# Patient Record
Sex: FEMALE | Race: WHITE | Employment: FULL TIME | URBAN - METROPOLITAN AREA
[De-identification: names, ages, dates, MRNs, and addresses within clinical notes are randomized per-mention and may not be internally consistent; named-entity substitution may affect disease eponyms.]

---

## 2017-05-23 ENCOUNTER — EMPLOYEE WELLNESS (OUTPATIENT)
Dept: OTHER | Age: 47
End: 2017-05-23

## 2017-05-23 LAB
CHOLESTEROL, TOTAL: 164 MG/DL (ref 0–199)
FASTING: YES
GLUCOSE BLD-MCNC: 101 MG/DL (ref 74–109)
HDLC SERPL-MCNC: 52 MG/DL (ref 40–90)
LDL CHOLESTEROL CALCULATED: 100 MG/DL
TRIGL SERPL-MCNC: 61 MG/DL (ref 0–199)

## 2017-07-24 ENCOUNTER — HOSPITAL ENCOUNTER (OUTPATIENT)
Dept: MAMMOGRAPHY | Age: 47
Discharge: HOME | End: 2017-07-24
Payer: COMMERCIAL

## 2017-07-24 DIAGNOSIS — Z13.9 SCREENING: ICD-10-CM

## 2017-07-24 PROCEDURE — G0202 SCR MAMMO BI INCL CAD: HCPCS

## 2018-03-20 VITALS — WEIGHT: 150 LBS | BODY MASS INDEX: 23.49 KG/M2

## 2018-04-12 ENCOUNTER — EMPLOYEE WELLNESS (OUTPATIENT)
Dept: OTHER | Age: 48
End: 2018-04-12

## 2018-04-12 LAB
CHOLESTEROL, TOTAL: 151 MG/DL (ref 0–199)
FASTING: YES
GLUCOSE BLD-MCNC: 94 MG/DL (ref 74–109)
HDLC SERPL-MCNC: 48 MG/DL (ref 40–90)
LDL CHOLESTEROL CALCULATED: 90 MG/DL
TRIGL SERPL-MCNC: 65 MG/DL (ref 0–199)

## 2018-04-16 VITALS — WEIGHT: 143 LBS | BODY MASS INDEX: 22.4 KG/M2

## 2018-09-10 ENCOUNTER — HOSPITAL ENCOUNTER (OUTPATIENT)
Dept: MAMMOGRAPHY | Age: 48
Discharge: HOME | End: 2018-09-10
Payer: COMMERCIAL

## 2018-09-10 DIAGNOSIS — Z12.39 BREAST CANCER SCREENING: ICD-10-CM

## 2018-09-10 PROCEDURE — 77067 SCR MAMMO BI INCL CAD: CPT

## 2018-11-15 ENCOUNTER — OFFICE VISIT (OUTPATIENT)
Dept: FAMILY MEDICINE CLINIC | Age: 48
End: 2018-11-15
Payer: COMMERCIAL

## 2018-11-15 VITALS — WEIGHT: 152.6 LBS | BODY MASS INDEX: 23.95 KG/M2 | HEIGHT: 67 IN | TEMPERATURE: 98.7 F

## 2018-11-15 DIAGNOSIS — Q62.5 DOUBLE URETER ON RIGHT: Chronic | ICD-10-CM

## 2018-11-15 DIAGNOSIS — N30.90 CYSTITIS: Primary | ICD-10-CM

## 2018-11-15 PROCEDURE — 99213 OFFICE O/P EST LOW 20 MIN: CPT | Performed by: FAMILY MEDICINE

## 2018-11-15 RX ORDER — SULFAMETHOXAZOLE AND TRIMETHOPRIM 800; 160 MG/1; MG/1
1 TABLET ORAL 2 TIMES DAILY
Qty: 60 TABLET | Refills: 0 | Status: SHIPPED | OUTPATIENT
Start: 2018-11-15 | End: 2019-12-27

## 2018-11-15 ASSESSMENT — PATIENT HEALTH QUESTIONNAIRE - PHQ9
1. LITTLE INTEREST OR PLEASURE IN DOING THINGS: 0
SUM OF ALL RESPONSES TO PHQ QUESTIONS 1-9: 0
SUM OF ALL RESPONSES TO PHQ QUESTIONS 1-9: 0
2. FEELING DOWN, DEPRESSED OR HOPELESS: 0
SUM OF ALL RESPONSES TO PHQ9 QUESTIONS 1 & 2: 0

## 2019-04-26 ENCOUNTER — EMPLOYEE WELLNESS (OUTPATIENT)
Dept: OTHER | Age: 49
End: 2019-04-26

## 2019-04-26 LAB
CHOLESTEROL, TOTAL: 161 MG/DL (ref 0–199)
FASTING: YES
GLUCOSE BLD-MCNC: 96 MG/DL (ref 74–109)
HDLC SERPL-MCNC: 51 MG/DL (ref 40–90)
LDL CHOLESTEROL CALCULATED: 101 MG/DL
TRIGL SERPL-MCNC: 44 MG/DL (ref 0–199)

## 2019-04-29 VITALS — BODY MASS INDEX: 23.02 KG/M2 | WEIGHT: 147 LBS

## 2019-06-29 ENCOUNTER — HOSPITAL ENCOUNTER (EMERGENCY)
Age: 49
Discharge: HOME | End: 2019-06-29
Payer: COMMERCIAL

## 2019-06-29 ENCOUNTER — APPOINTMENT (OUTPATIENT)
Dept: CT IMAGING | Age: 49
End: 2019-06-29
Payer: COMMERCIAL

## 2019-06-29 VITALS
HEIGHT: 67 IN | HEART RATE: 80 BPM | OXYGEN SATURATION: 100 % | BODY MASS INDEX: 22.76 KG/M2 | TEMPERATURE: 98 F | WEIGHT: 145 LBS | DIASTOLIC BLOOD PRESSURE: 81 MMHG | SYSTOLIC BLOOD PRESSURE: 129 MMHG | RESPIRATION RATE: 18 BRPM

## 2019-06-29 DIAGNOSIS — R10.84 GENERALIZED ABDOMINAL PAIN: Primary | ICD-10-CM

## 2019-06-29 DIAGNOSIS — K57.92 ACUTE DIVERTICULITIS: ICD-10-CM

## 2019-06-29 DIAGNOSIS — R11.0 NAUSEA: ICD-10-CM

## 2019-06-29 LAB
ALBUMIN SERPL-MCNC: 4 G/DL (ref 3.5–5.1)
ALP BLD-CCNC: 62 U/L (ref 38–126)
ALT SERPL-CCNC: 8 U/L (ref 11–66)
ANION GAP SERPL CALCULATED.3IONS-SCNC: 11 MEQ/L (ref 8–16)
AST SERPL-CCNC: 17 U/L (ref 5–40)
BASOPHILS # BLD: 0.4 %
BASOPHILS ABSOLUTE: 0 THOU/MM3 (ref 0–0.1)
BILIRUB SERPL-MCNC: 0.6 MG/DL (ref 0.3–1.2)
BILIRUBIN DIRECT: < 0.2 MG/DL (ref 0–0.3)
BILIRUBIN URINE: NEGATIVE
BLOOD, URINE: NEGATIVE
BUN BLDV-MCNC: 13 MG/DL (ref 7–22)
CALCIUM SERPL-MCNC: 8.7 MG/DL (ref 8.5–10.5)
CHARACTER, URINE: CLEAR
CHLORIDE BLD-SCNC: 104 MEQ/L (ref 98–111)
CO2: 22 MEQ/L (ref 23–33)
COLOR: YELLOW
CREAT SERPL-MCNC: 0.8 MG/DL (ref 0.4–1.2)
EOSINOPHIL # BLD: 0.6 %
EOSINOPHILS ABSOLUTE: 0.1 THOU/MM3 (ref 0–0.4)
ERYTHROCYTE [DISTWIDTH] IN BLOOD BY AUTOMATED COUNT: 12.5 % (ref 11.5–14.5)
ERYTHROCYTE [DISTWIDTH] IN BLOOD BY AUTOMATED COUNT: 44.9 FL (ref 35–45)
GFR SERPL CREATININE-BSD FRML MDRD: 75 ML/MIN/1.73M2
GLUCOSE BLD-MCNC: 99 MG/DL (ref 70–108)
GLUCOSE URINE: NEGATIVE MG/DL
HCT VFR BLD CALC: 38.9 % (ref 37–47)
HEMOGLOBIN: 13.3 GM/DL (ref 12–16)
IMMATURE GRANS (ABS): 0.05 THOU/MM3 (ref 0–0.07)
IMMATURE GRANULOCYTES: 0.5 %
KETONES, URINE: NEGATIVE
LEUKOCYTE ESTERASE, URINE: NEGATIVE
LIPASE: 23.5 U/L (ref 5.6–51.3)
LYMPHOCYTES # BLD: 17.1 %
LYMPHOCYTES ABSOLUTE: 1.9 THOU/MM3 (ref 1–4.8)
MCH RBC QN AUTO: 33.3 PG (ref 26–33)
MCHC RBC AUTO-ENTMCNC: 34.2 GM/DL (ref 32.2–35.5)
MCV RBC AUTO: 97.5 FL (ref 81–99)
MONOCYTES # BLD: 9.5 %
MONOCYTES ABSOLUTE: 1 THOU/MM3 (ref 0.4–1.3)
NITRITE, URINE: NEGATIVE
NUCLEATED RED BLOOD CELLS: 0 /100 WBC
OSMOLALITY CALCULATION: 274 MOSMOL/KG (ref 275–300)
PH UA: 6 (ref 5–9)
PLATELET # BLD: 223 THOU/MM3 (ref 130–400)
PMV BLD AUTO: 9.6 FL (ref 9.4–12.4)
POTASSIUM SERPL-SCNC: 3.9 MEQ/L (ref 3.5–5.2)
PREGNANCY, SERUM: NEGATIVE
PROTEIN UA: NEGATIVE
RBC # BLD: 3.99 MILL/MM3 (ref 4.2–5.4)
SEG NEUTROPHILS: 71.9 %
SEGMENTED NEUTROPHILS ABSOLUTE COUNT: 7.9 THOU/MM3 (ref 1.8–7.7)
SODIUM BLD-SCNC: 137 MEQ/L (ref 135–145)
SPECIFIC GRAVITY, URINE: 1.01 (ref 1–1.03)
TOTAL PROTEIN: 7.3 G/DL (ref 6.1–8)
UROBILINOGEN, URINE: 0.2 EU/DL (ref 0–1)
WBC # BLD: 11 THOU/MM3 (ref 4.8–10.8)

## 2019-06-29 PROCEDURE — 99213 OFFICE O/P EST LOW 20 MIN: CPT | Performed by: NURSE PRACTITIONER

## 2019-06-29 PROCEDURE — 83690 ASSAY OF LIPASE: CPT

## 2019-06-29 PROCEDURE — 36415 COLL VENOUS BLD VENIPUNCTURE: CPT

## 2019-06-29 PROCEDURE — 6360000002 HC RX W HCPCS: Performed by: PHYSICIAN ASSISTANT

## 2019-06-29 PROCEDURE — 74176 CT ABD & PELVIS W/O CONTRAST: CPT

## 2019-06-29 PROCEDURE — 96375 TX/PRO/DX INJ NEW DRUG ADDON: CPT

## 2019-06-29 PROCEDURE — 2500000003 HC RX 250 WO HCPCS: Performed by: PHYSICIAN ASSISTANT

## 2019-06-29 PROCEDURE — 96365 THER/PROPH/DIAG IV INF INIT: CPT

## 2019-06-29 PROCEDURE — 81003 URINALYSIS AUTO W/O SCOPE: CPT

## 2019-06-29 PROCEDURE — 82248 BILIRUBIN DIRECT: CPT

## 2019-06-29 PROCEDURE — 80053 COMPREHEN METABOLIC PANEL: CPT

## 2019-06-29 PROCEDURE — 84703 CHORIONIC GONADOTROPIN ASSAY: CPT

## 2019-06-29 PROCEDURE — 96368 THER/DIAG CONCURRENT INF: CPT

## 2019-06-29 PROCEDURE — 85025 COMPLETE CBC W/AUTO DIFF WBC: CPT

## 2019-06-29 PROCEDURE — 99215 OFFICE O/P EST HI 40 MIN: CPT

## 2019-06-29 PROCEDURE — 99284 EMERGENCY DEPT VISIT MOD MDM: CPT

## 2019-06-29 PROCEDURE — 2580000003 HC RX 258: Performed by: PHYSICIAN ASSISTANT

## 2019-06-29 RX ORDER — ONDANSETRON 4 MG/1
4 TABLET, FILM COATED ORAL EVERY 8 HOURS PRN
Qty: 20 TABLET | Refills: 0 | Status: SHIPPED | OUTPATIENT
Start: 2019-06-29 | End: 2019-07-19

## 2019-06-29 RX ORDER — CIPROFLOXACIN 500 MG/1
500 TABLET, FILM COATED ORAL 2 TIMES DAILY
Qty: 20 TABLET | Refills: 0 | Status: SHIPPED | OUTPATIENT
Start: 2019-06-29 | End: 2019-07-09

## 2019-06-29 RX ORDER — 0.9 % SODIUM CHLORIDE 0.9 %
1000 INTRAVENOUS SOLUTION INTRAVENOUS ONCE
Status: COMPLETED | OUTPATIENT
Start: 2019-06-29 | End: 2019-06-29

## 2019-06-29 RX ORDER — METRONIDAZOLE 500 MG/1
500 TABLET ORAL 3 TIMES DAILY
Qty: 30 TABLET | Refills: 0 | Status: SHIPPED | OUTPATIENT
Start: 2019-06-29 | End: 2019-07-09

## 2019-06-29 RX ORDER — CIPROFLOXACIN 2 MG/ML
400 INJECTION, SOLUTION INTRAVENOUS ONCE
Status: COMPLETED | OUTPATIENT
Start: 2019-06-29 | End: 2019-06-29

## 2019-06-29 RX ORDER — ONDANSETRON 2 MG/ML
4 INJECTION INTRAMUSCULAR; INTRAVENOUS ONCE
Status: COMPLETED | OUTPATIENT
Start: 2019-06-29 | End: 2019-06-29

## 2019-06-29 RX ORDER — HYDROCODONE BITARTRATE AND ACETAMINOPHEN 5; 325 MG/1; MG/1
1 TABLET ORAL EVERY 6 HOURS PRN
Qty: 12 TABLET | Refills: 0 | Status: SHIPPED | OUTPATIENT
Start: 2019-06-29 | End: 2019-07-02

## 2019-06-29 RX ORDER — MORPHINE SULFATE 2 MG/ML
2 INJECTION, SOLUTION INTRAMUSCULAR; INTRAVENOUS ONCE
Status: COMPLETED | OUTPATIENT
Start: 2019-06-29 | End: 2019-06-29

## 2019-06-29 RX ADMIN — SODIUM CHLORIDE 1000 ML: 9 INJECTION, SOLUTION INTRAVENOUS at 14:20

## 2019-06-29 RX ADMIN — CIPROFLOXACIN 400 MG: 2 INJECTION, SOLUTION INTRAVENOUS at 16:06

## 2019-06-29 RX ADMIN — MORPHINE SULFATE 2 MG: 2 INJECTION, SOLUTION INTRAMUSCULAR; INTRAVENOUS at 14:16

## 2019-06-29 RX ADMIN — METRONIDAZOLE 500 MG: 500 INJECTION, SOLUTION INTRAVENOUS at 16:06

## 2019-06-29 RX ADMIN — ONDANSETRON 4 MG: 2 INJECTION INTRAMUSCULAR; INTRAVENOUS at 14:16

## 2019-06-29 ASSESSMENT — PAIN DESCRIPTION - FREQUENCY: FREQUENCY: CONTINUOUS

## 2019-06-29 ASSESSMENT — ENCOUNTER SYMPTOMS
ABDOMINAL PAIN: 1
STRIDOR: 0
CHEST TIGHTNESS: 0
VOMITING: 0
DIARRHEA: 0
ABDOMINAL DISTENTION: 0
COUGH: 0
NAUSEA: 1
WHEEZING: 0
SHORTNESS OF BREATH: 0

## 2019-06-29 ASSESSMENT — PAIN DESCRIPTION - PROGRESSION: CLINICAL_PROGRESSION: NOT CHANGED

## 2019-06-29 ASSESSMENT — PAIN SCALES - GENERAL
PAINLEVEL_OUTOF10: 4
PAINLEVEL_OUTOF10: 5
PAINLEVEL_OUTOF10: 4

## 2019-06-29 ASSESSMENT — PAIN DESCRIPTION - ONSET: ONSET: AWAKENED FROM SLEEP

## 2019-06-29 ASSESSMENT — PAIN DESCRIPTION - PAIN TYPE: TYPE: ACUTE PAIN

## 2019-06-29 ASSESSMENT — PAIN DESCRIPTION - LOCATION: LOCATION: ABDOMEN

## 2019-06-29 ASSESSMENT — PAIN - FUNCTIONAL ASSESSMENT: PAIN_FUNCTIONAL_ASSESSMENT: ACTIVITIES ARE NOT PREVENTED

## 2019-06-29 ASSESSMENT — PAIN DESCRIPTION - DESCRIPTORS: DESCRIPTORS: CRAMPING;DISCOMFORT

## 2019-06-29 NOTE — ED PROVIDER NOTES
moist, No oral exudates, Nose normal.    Eyes: PERRLA, EOMI, Conjunctiva normal, No discharge. Neck: Normal range of motion, No tenderness, Supple, No stridor. Cardiovascular: Normal heart rate, Normal rhythm, No murmurs, No rubs, No gallops. Pulmonary/Chest: Normal breath sounds, No respiratory distress, No wheezing,     Abdomen: Bowel sounds normal, Soft, LLQ and suprapubic tenderness, No masses, No pulsatile masses    Back: No tenderness, No CVA tenderness    Extremities: Normal range of motion, Intact distal pulses, No edema, No tenderness; Neurologic: Alert & oriented x 3, CN II-XII intact; Normal motor function, Normal sensory function, No focal defecits    Skin: Warm, Dry, No erythema, No rash    Psychiatric: Alert and oriented to person, place, and time. Patient maintains good eye contact. Mood and affect were normal. Concentration appeared normal      Diagnostic Studies    Please see electronic medical record for any tests performed in the ED.      Labs:    Labs Reviewed   CBC WITH AUTO DIFFERENTIAL - Abnormal; Notable for the following components:       Result Value    WBC 11.0 (*)     RBC 3.99 (*)     MCH 33.3 (*)     Segs Absolute 7.9 (*)     All other components within normal limits   BASIC METABOLIC PANEL - Abnormal; Notable for the following components:    CO2 22 (*)     All other components within normal limits   HEPATIC FUNCTION PANEL - Abnormal; Notable for the following components:    ALT 8 (*)     All other components within normal limits   GLOMERULAR FILTRATION RATE, ESTIMATED - Abnormal; Notable for the following components:    Est, Glom Filt Rate 75 (*)     All other components within normal limits   OSMOLALITY - Abnormal; Notable for the following components:    Osmolality Calc 274.0 (*)     All other components within normal limits   URINE RT REFLEX TO CULTURE   LIPASE   HCG, SERUM, QUALITATIVE   ANION GAP       Radiology:    CT ABDOMEN PELVIS WO CONTRAST Additional Contrast?

## 2019-07-01 ENCOUNTER — OFFICE VISIT (OUTPATIENT)
Dept: FAMILY MEDICINE CLINIC | Age: 49
End: 2019-07-01
Payer: COMMERCIAL

## 2019-07-01 VITALS
TEMPERATURE: 98.6 F | DIASTOLIC BLOOD PRESSURE: 60 MMHG | WEIGHT: 151.2 LBS | HEIGHT: 67 IN | HEART RATE: 76 BPM | BODY MASS INDEX: 23.73 KG/M2 | SYSTOLIC BLOOD PRESSURE: 90 MMHG

## 2019-07-01 DIAGNOSIS — K57.32 DIVERTICULITIS OF LARGE INTESTINE WITHOUT PERFORATION OR ABSCESS WITHOUT BLEEDING: Primary | ICD-10-CM

## 2019-07-01 PROCEDURE — 99213 OFFICE O/P EST LOW 20 MIN: CPT | Performed by: FAMILY MEDICINE

## 2019-07-01 ASSESSMENT — PATIENT HEALTH QUESTIONNAIRE - PHQ9
1. LITTLE INTEREST OR PLEASURE IN DOING THINGS: 0
SUM OF ALL RESPONSES TO PHQ9 QUESTIONS 1 & 2: 0
SUM OF ALL RESPONSES TO PHQ QUESTIONS 1-9: 0
SUM OF ALL RESPONSES TO PHQ QUESTIONS 1-9: 0
2. FEELING DOWN, DEPRESSED OR HOPELESS: 0

## 2019-07-01 NOTE — PROGRESS NOTES
Name:  Blaire Harrell  :    1970    Chief Complaint   Patient presents with    Follow-Up from Hospital     abdominal pain nausea seen at [facility] ER       HPI:  Was to ER for ABD pain. Not UTI and CT showed Diverticulitis. Given Flagyl and Cipro. Mild constipation to start. No blood or diarrhea. No fever. Plans to work. First episode. Previous negative colonoscopy. Physical Exam:      BP 90/60 (Site: Left Upper Arm, Position: Sitting, Cuff Size: Medium Adult)   Pulse 76   Temp 98.6 °F (37 °C) (Oral)   Ht 5' 7\" (1.702 m)   Wt 151 lb 3.2 oz (68.6 kg)   LMP 2019 (Exact Date)   BMI 23.68 kg/m²     Not ill. Lungs are clear. Heart in RRR with no murmur. ABD sith mild LLQ tenderness. No mass or rebound. Assessment/Plan:      Diverticulitis. Finish ABT. Fiber and prevention.     Corey Harris was seen today for follow-up from hospital.    Diagnoses and all orders for this visit:    Diverticulitis of large intestine without perforation or abscess without bleeding

## 2019-07-01 NOTE — LETTER
[facility] [address] [address]  Phone: [phone]  Fax: [phone]    Constanza Sanches MD        7/1/2019    Patient: Hardy Helton   YOB: 1970   Date of Visit: 7/1/2019       To Whom it May Concern:    Hardy Helton was seen in my clinic on 7/1/2019. She may return to work on 7/2/2019. If you have any questions or concerns, please don't hesitate to call.     Sincerely,             Constanza Sanches MD

## 2019-10-07 ENCOUNTER — HOSPITAL ENCOUNTER (OUTPATIENT)
Dept: MAMMOGRAPHY | Age: 49
Discharge: HOME | End: 2019-10-07
Payer: COMMERCIAL

## 2019-10-07 DIAGNOSIS — Z12.39 BREAST CANCER SCREENING: ICD-10-CM

## 2019-10-07 PROCEDURE — 77063 BREAST TOMOSYNTHESIS BI: CPT

## 2019-10-07 PROCEDURE — 77067 SCR MAMMO BI INCL CAD: CPT

## 2019-12-27 ENCOUNTER — OFFICE VISIT (OUTPATIENT)
Dept: FAMILY MEDICINE CLINIC | Age: 49
End: 2019-12-27
Payer: COMMERCIAL

## 2019-12-27 VITALS
WEIGHT: 151.8 LBS | BODY MASS INDEX: 23.78 KG/M2 | DIASTOLIC BLOOD PRESSURE: 62 MMHG | SYSTOLIC BLOOD PRESSURE: 118 MMHG | TEMPERATURE: 98.7 F | HEART RATE: 84 BPM

## 2019-12-27 DIAGNOSIS — J01.90 ACUTE SINUSITIS, RECURRENCE NOT SPECIFIED, UNSPECIFIED LOCATION: Primary | ICD-10-CM

## 2019-12-27 DIAGNOSIS — N30.90 CYSTITIS: ICD-10-CM

## 2019-12-27 PROCEDURE — 99213 OFFICE O/P EST LOW 20 MIN: CPT | Performed by: FAMILY MEDICINE

## 2019-12-27 RX ORDER — AMOXICILLIN 500 MG/1
500 CAPSULE ORAL 2 TIMES DAILY
Qty: 20 CAPSULE | Refills: 0 | Status: SHIPPED | OUTPATIENT
Start: 2019-12-27 | End: 2020-01-06

## 2019-12-27 RX ORDER — SULFAMETHOXAZOLE AND TRIMETHOPRIM 800; 160 MG/1; MG/1
1 TABLET ORAL 2 TIMES DAILY
Qty: 60 TABLET | Refills: 0 | Status: SHIPPED | OUTPATIENT
Start: 2019-12-27 | End: 2020-07-27 | Stop reason: ALTCHOICE

## 2019-12-27 RX ORDER — PROMETHAZINE HYDROCHLORIDE AND CODEINE PHOSPHATE 6.25; 1 MG/5ML; MG/5ML
5 SYRUP ORAL 4 TIMES DAILY PRN
Qty: 120 ML | Refills: 1 | Status: SHIPPED | OUTPATIENT
Start: 2019-12-27 | End: 2020-01-03

## 2020-06-30 ENCOUNTER — TELEPHONE (OUTPATIENT)
Dept: FAMILY MEDICINE CLINIC | Age: 50
End: 2020-06-30

## 2020-07-01 ENCOUNTER — HOSPITAL ENCOUNTER (OUTPATIENT)
Age: 50
Discharge: HOME | End: 2020-07-01
Payer: COMMERCIAL

## 2020-07-01 DIAGNOSIS — Z78.9 HISTORY OF RECENT TRAVEL: ICD-10-CM

## 2020-07-01 PROCEDURE — 99211 OFF/OP EST MAY X REQ PHY/QHP: CPT

## 2020-07-01 PROCEDURE — U0003 INFECTIOUS AGENT DETECTION BY NUCLEIC ACID (DNA OR RNA); SEVERE ACUTE RESPIRATORY SYNDROME CORONAVIRUS 2 (SARS-COV-2) (CORONAVIRUS DISEASE [COVID-19]), AMPLIFIED PROBE TECHNIQUE, MAKING USE OF HIGH THROUGHPUT TECHNOLOGIES AS DESCRIBED BY CMS-2020-01-R: HCPCS

## 2020-07-01 NOTE — PROGRESS NOTES
Presents for COVID-19 testing. Placed in room 1 with negative pressure. Pt is asymptomatic, but recently traveled to [facility]. COVID-19 throat swab obtained, left ambulatory in stable cond.

## 2020-07-03 LAB — SARS-COV-2: NOT DETECTED

## 2020-07-06 ENCOUNTER — TELEPHONE (OUTPATIENT)
Dept: FAMILY MEDICINE CLINIC | Age: 50
End: 2020-07-06

## 2020-07-06 NOTE — TELEPHONE ENCOUNTER
Results visible on my chart [place] , [facility]    Physical Therapy Daily Treatment Note  Date:  2017    Patient Name:  Lexi Gaspar    :  1964  MRN: [id number]  Restrictions/Precautions:    Medical/Treatment Diagnosis Information:  Diagnosis: M75.41 (R subacromial impingement)  Treatment Diagnosis: M75.41 (R subacromial impingement)   R SAD, DCE, labral debridement DOS   Insurance/Certification information:  PT Insurance Information: UMR/no limit   Physician Information:  Referring Practitioner: Dr. Siri Cameron of care signed (Y/N):     Date of Patient follow up with Physician:     G-Code (if applicable):      Date G-Code Applied:         Progress Note: [x]  Yes  []  No  Next due by: Visit #10      Latex Allergy:  [x]NO      []YES  Preferred Language for Healthcare:   [x]English       []other:    Visit # Insurance Allowable   3 Limit      Pain level:  5/10     SUBJECTIVE:  Pt reports she was unable to take Tylenol last night and the shoulder is extremely achy today, she was unable to sleep last night.      OBJECTIVE: See eval  Observation:   Test measurements:      RESTRICTIONS/PRECAUTIONS:  R SAD, DCE, labral debridement DOS 17    Exercises/Interventions:   Therapeutic Ex Wt / Resistance Sets/sec Reps Notes   Pulleys  6 min     Cane AAROM flex/press/ER  2 15    Table slides   2 15 Flex/scap   Wedge ER stretch   5 15 s    4 way Isomet  2 15 s    AAROM shoulder flex   x10    T- band Row/pinch    NPV   T- band lower pinch       T- band ER activation       Scap squeezes  2 10    Supine SA punch    NPV   SL ER       Prone Rows/HAB/ext       Seat Table slides/Wall Slides       Seated HH  Depression       No Money       Standing flex/scap       Standing Punch              Pendulums   x25     Upper trap stretch   3 30s                  Manual Intervention       Shld /GH Mobs  25 min     Post Cap mobs       Thoracic/Rib manipualtion       CT MT/Mobs PROM MT                     NMR re-education       T-spine Ext       GH depres/compress       Kierra Scap Bio       Scap/GH NMR       Body blade       Wall ball roll       Wall Ball bounce                  Therapeutic Exercise and NMR EXR  [x] (67525) Provided verbal/tactile cueing for activities related to strengthening, flexibility, endurance, ROM  for improvements in scapular, scapulothoracic and UE control with self care, reaching, carrying, lifting, house/yardwork, driving/computer work.    [] (24564) Provided verbal/tactile cueing for activities related to improving balance, coordination, kinesthetic sense, posture, motor skill, proprioception  to assist with  scapular, scapulothoracic and UE control with self care, reaching, carrying, lifting, house/yardwork, driving/computer work. Therapeutic Activities:    [] (33633 or 03957) Provided verbal/tactile cueing for activities related to improving balance, coordination, kinesthetic sense, posture, motor skill, proprioception and motor activation to allow for proper function of scapular, scapulothoracic and UE control with self care, carrying, lifting, driving/computer work.      Home Exercise Program:    [x] (75182) Reviewed/Progressed HEP activities related to strengthening, flexibility, endurance, ROM of scapular, scapulothoracic and UE control with self care, reaching, carrying, lifting, house/yardwork, driving/computer work  [] (03901) Reviewed/Progressed HEP activities related to improving balance, coordination, kinesthetic sense, posture, motor skill, proprioception of scapular, scapulothoracic and UE control with self care, reaching, carrying, lifting, house/yardwork, driving/computer work      Manual Treatments:  PROM / STM / Oscillations-Mobs:  G-I, II, III, IV (PA's, Inf., Post.)  [x] (10917) Provided manual therapy to mobilize soft tissue/joints of cervical/CT, scapular GHJ and UE for the purpose of modulating pain, promoting relaxation,  increasing

## 2020-07-26 ASSESSMENT — ENCOUNTER SYMPTOMS
NAUSEA: 0
EYE REDNESS: 0
COUGH: 0
VOMITING: 0
RHINORRHEA: 0
SHORTNESS OF BREATH: 0
SORE THROAT: 0

## 2020-07-26 NOTE — PROGRESS NOTES
[address] [place] [address] [address], [address], [place]  Phone:  [phone]  Fax:  [phone]      [address] [place]       Name: Arabella Iverson  : 1970          Chief Complaint:     Chief Complaint   Patient presents with   [employer] [facility]       History of Present Illness:      Arabella Iverson is a 44 y.o.  female who presents today for a health maintenance examination. 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[employer])  Routine dental exams?:  Yes  Routine eye exams?:  Yes  Last mammogram:  10/2019  Last colon cancer screenin2018  Last PAP:  Due this year      Past Medical History:     Past Medical History:   Diagnosis Date    Allergic rhinitis     Headache(784.0)     Pyelonephritis         Past Surgical History:     Past Surgical History:   Procedure Laterality Date    CHOLECYSTECTOMY      TONSILLECTOMY AND ADENOIDECTOMY          Medications:       Outpatient Medications Prior to Visit   Medication Sig Dispense Refill    Loratadine-Pseudoephedrine (CLARITIN-D 24 HOUR PO) Take by mouth daily      sulfamethoxazole-trimethoprim (BACTRIM DS) 800-160 MG per tablet Take 1 tablet by mouth 2 times daily 60 tablet 0     No facility-administered medications prior to visit. Allergies       Patient has no known allergies.       Social History:     Social:          Social History     Socioeconomic History    Marital status:      Spouse name: Not on file    Number of children: Not on file    Years of education: Not on file    Highest education level: Not on file   Occupational History    Not on file   Social Needs    Financial resource strain: Not on file    Food insecurity     Worry: Not on file     Inability: Not on file   [employer] needs     Medical: Not on file     Non-medical: Not on file   Tobacco Use    Smoking status: Never Smoker    Smokeless tobacco: Never Used   Substance and Sexual Activity    Alcohol use: Not on file    Drug use: No    Sexual activity: Not on file   Lifestyle    Physical activity     Days per week: Not on file     Minutes per session: Not on file    Stress: Not on file   Relationships    Social connections     Talks on phone: Not on file     Gets together: Not on file     Attends Hindu service: Not on file     Active member of club or organization: Not on file     Attends meetings of clubs or organizations: Not on file     Relationship status: Not on file    Intimate partner violence     Fear of current or ex partner: Not on file     Emotionally abused: Not on file     Physically abused: Not on file     Forced sexual activity: Not on file   Other Topics Concern    Not on file   Social History Narrative    Not on file       Family History:     History reviewed. No pertinent family history. Review of Systems:     Review of Systems   Constitutional: Negative for chills and fever. HENT: Negative for congestion, rhinorrhea and sore throat. Eyes: Negative for redness and visual disturbance. Respiratory: Negative for cough and shortness of breath. Cardiovascular: Negative for chest pain and palpitations. Gastrointestinal: Negative for nausea and vomiting. Genitourinary: Negative for dysuria and frequency. Musculoskeletal: Negative for arthralgias and myalgias. Allergic/Immunologic: Negative for environmental allergies and food allergies. Neurological: Negative for weakness and headaches. Psychiatric/Behavioral: Negative for decreased concentration and dysphoric mood. Physical Exam:     Vitals:  Blood pressure 116/84, pulse 78, temperature 97.6 °F (36.4 °C), height 5' 7\" (1.702 m), weight 156 lb (70.8 kg), SpO2 99 %. General appearance:  Alert, well appearing, and in no acute distress. Mental status:  Oriented to person, place, and time with normal affect. Head:  Normocephalic and atraumatic.   Eyes:   Extraocular eye movements intact, conjunctiva clear. Ears:  Hearing appears to be intact. Nose:  No drainage noted. Mouth:  Mucous membranes moist.  Neck:  Supple, no carotid bruits, thyroid not palpable. Heart:  Normal rate, regular rhythm, no murmurs. Lungs:  Clear to auscultation bilaterally. Respirations unlabored. Abdomen:  Soft, nondistended, bowel sounds present all four quadrants. No masses, hepatomegaly, or splenomegaly. Neurological:  Normal speech. Extremities:  Peripheral pulses palpable, no pedal edema. Skin:  No gross lesions, rashes, or induration noted. Assessment:      Diagnosis Orders   1. Well adult exam     2. Encounter for screening mammogram for breast cancer  MAJO DIGITAL SCREEN W OR WO CAD BILATERAL       Plan:     Orders Placed This Encounter   Procedures    MAJO DIGITAL SCREEN W OR WO CAD BILATERAL     Standing Status:   Future     Standing Expiration Date:   7/26/2021     Order Specific Question:   Reason for exam:     Answer:   Screening for Breast Cancer       Counseling Completed:  Tobacco and secondary smoke risks reviewed; instructed on cessation and avoidance. Repeat pap every 3 - 5 years if negative for women over the age of 27. Every 3 years under 27years old. Mammograms every 1 year if age > 35 yo and last mammogram was negative. Colon cancer screening reviewed age > 48, or < if indicated. Labs ordered, if indicated. Influenza vaccine recommended, if in season. Pneumonia vaccination if > 65 or indicated. Routine eye and dental exams advised. Exercise and healthy diet discussed. Return in about 1 year (around 7/27/2021) for well/preventative visit.     Electronically signed by Pete Hook MD on 7/27/2020 at 4:13 PM

## 2020-07-27 ENCOUNTER — OFFICE VISIT (OUTPATIENT)
Dept: FAMILY MEDICINE CLINIC | Age: 50
End: 2020-07-27
Payer: COMMERCIAL

## 2020-07-27 VITALS
HEART RATE: 78 BPM | SYSTOLIC BLOOD PRESSURE: 116 MMHG | HEIGHT: 67 IN | TEMPERATURE: 97.6 F | OXYGEN SATURATION: 99 % | WEIGHT: 156 LBS | BODY MASS INDEX: 24.48 KG/M2 | DIASTOLIC BLOOD PRESSURE: 84 MMHG

## 2020-07-27 PROCEDURE — 99396 PREV VISIT EST AGE 40-64: CPT | Performed by: FAMILY MEDICINE

## 2020-11-21 ENCOUNTER — HOSPITAL ENCOUNTER (EMERGENCY)
Age: 50
Discharge: HOME | End: 2020-11-21
Payer: COMMERCIAL

## 2020-11-21 VITALS
RESPIRATION RATE: 18 BRPM | SYSTOLIC BLOOD PRESSURE: 137 MMHG | WEIGHT: 150 LBS | OXYGEN SATURATION: 97 % | BODY MASS INDEX: 23.54 KG/M2 | HEIGHT: 67 IN | DIASTOLIC BLOOD PRESSURE: 88 MMHG | TEMPERATURE: 98.7 F | HEART RATE: 87 BPM

## 2020-11-21 PROCEDURE — U0002 COVID-19 LAB TEST NON-CDC: HCPCS

## 2020-11-21 PROCEDURE — 99213 OFFICE O/P EST LOW 20 MIN: CPT

## 2020-11-21 PROCEDURE — U0003 INFECTIOUS AGENT DETECTION BY NUCLEIC ACID (DNA OR RNA); SEVERE ACUTE RESPIRATORY SYNDROME CORONAVIRUS 2 (SARS-COV-2) (CORONAVIRUS DISEASE [COVID-19]), AMPLIFIED PROBE TECHNIQUE, MAKING USE OF HIGH THROUGHPUT TECHNOLOGIES AS DESCRIBED BY CMS-2020-01-R: HCPCS

## 2020-11-21 PROCEDURE — 99212 OFFICE O/P EST SF 10 MIN: CPT | Performed by: NURSE PRACTITIONER

## 2020-11-21 SDOH — HEALTH STABILITY: MENTAL HEALTH: HOW OFTEN DO YOU HAVE A DRINK CONTAINING ALCOHOL?: NEVER

## 2020-11-21 ASSESSMENT — ENCOUNTER SYMPTOMS
RHINORRHEA: 0
SORE THROAT: 0
SINUS PRESSURE: 0
DIARRHEA: 0
NAUSEA: 0
BACK PAIN: 0
SHORTNESS OF BREATH: 0
TROUBLE SWALLOWING: 0
COUGH: 1
VOMITING: 0

## 2020-11-21 NOTE — ED NOTES
Patient verbalized understanding of discharge instructions and medications prescribed. Denies questions or concerns at this time .      Mery Dutton RN  11/21/20 8818

## 2020-11-21 NOTE — ED PROVIDER NOTES
Cardiovascular: Negative for chest pain. Gastrointestinal: Negative for diarrhea, nausea and vomiting. Musculoskeletal: Negative for back pain and neck stiffness. Skin: Negative for rash. Allergic/Immunologic: Negative for environmental allergies. Neurological: Negative for dizziness, light-headedness and headaches. Hematological: Negative for adenopathy. PAST MEDICAL HISTORY         Diagnosis Date    Allergic rhinitis     Headache(784.0)     Pyelonephritis        SURGICALHISTORY     Patient  has a past surgical history that includes Tonsillectomy and adenoidectomy and Cholecystectomy. CURRENT MEDICATIONS       Previous Medications    LORATADINE-PSEUDOEPHEDRINE (CLARITIN-D 24 HOUR PO)    Take by mouth daily       ALLERGIES     Patient is has No Known Allergies. Patients   Immunization History   Administered Date(s) Administered    Influenza Virus Vaccine 10/10/2017, 10/22/2018, 10/14/2019, 10/12/2020    Influenza, Darrall Spittle, IM, (6 mo and older Fluzone, Flulaval, Fluarix and 3 yrs and older Afluria) 09/30/2016    Td, unspecified formulation 03/28/2003       FAMILY HISTORY     Patient's family history is not on file. SOCIAL HISTORY     Patient  reports that she has never smoked. She has never used smokeless tobacco. She reports that she does not drink alcohol or use drugs. PHYSICAL EXAM     ED TRIAGE VITALS  BP: 137/88, Temp: 98.7 °F (37.1 °C), Pulse: 87, Resp: 18, SpO2: 97 %,Estimated body mass index is 23.49 kg/m² as calculated from the following:    Height as of this encounter: 5' 7\" (1.702 m). Weight as of this encounter: 150 lb (68 kg). ,Patient's last menstrual period was 01/07/2019 (exact date). Physical Exam  Vitals signs and nursing note reviewed. Constitutional:       General: She is not in acute distress. Appearance: Normal appearance. She is well-developed and well-groomed. She is not ill-appearing, toxic-appearing or diaphoretic.    HENT:      Head: Normocephalic. Right Ear: Hearing, tympanic membrane, ear canal and external ear normal. No drainage, swelling or tenderness. No mastoid tenderness. Left Ear: Hearing, tympanic membrane, ear canal and external ear normal. No drainage, swelling or tenderness. No mastoid tenderness. Nose: Nose normal.      Right Sinus: No maxillary sinus tenderness or frontal sinus tenderness. Left Sinus: No maxillary sinus tenderness or frontal sinus tenderness. Mouth/Throat:      Lips: Pink. Mouth: Mucous membranes are moist.      Pharynx: Uvula midline. No posterior oropharyngeal erythema or uvula swelling. Tonsils: No tonsillar exudate or tonsillar abscesses. Eyes:      Conjunctiva/sclera: Conjunctivae normal.      Pupils: Pupils are equal, round, and reactive to light. Neck:      Musculoskeletal: Full passive range of motion without pain and normal range of motion. No neck rigidity. Cardiovascular:      Rate and Rhythm: Normal rate and regular rhythm. Heart sounds: Normal heart sounds. Pulmonary:      Effort: Pulmonary effort is normal. No accessory muscle usage. Breath sounds: Normal breath sounds. No decreased breath sounds, wheezing, rhonchi or rales. Abdominal:      General: Bowel sounds are normal.      Palpations: Abdomen is soft. Tenderness: There is no abdominal tenderness. There is no right CVA tenderness, left CVA tenderness or guarding. Negative signs include Barraza's sign. Lymphadenopathy:      Head:      Right side of head: No submental, submandibular, tonsillar, preauricular, posterior auricular or occipital adenopathy. Left side of head: No submental, submandibular, tonsillar, preauricular, posterior auricular or occipital adenopathy. Cervical: No cervical adenopathy. Right cervical: No superficial, deep or posterior cervical adenopathy. Left cervical: No superficial, deep or posterior cervical adenopathy.       Upper Body:      Right upper body: No supraclavicular adenopathy. Left upper body: No supraclavicular adenopathy. Skin:     General: Skin is warm and dry. Capillary Refill: Capillary refill takes less than 2 seconds. Findings: No rash. Neurological:      Mental Status: She is alert and oriented to person, place, and time. Psychiatric:         Mood and Affect: Mood normal.         Behavior: Behavior normal. Behavior is cooperative. DIAGNOSTIC RESULTS     Labs:No results found for this visit on 11/21/20. IMAGING:    No orders to display         EKG:      URGENT CARE COURSE:     Vitals:    11/21/20 1135   BP: 137/88   Pulse: 87   Resp: 18   Temp: 98.7 °F (37.1 °C)   TempSrc: Oral   SpO2: 97%   Weight: 150 lb (68 kg)   Height: 5' 7\" (1.702 m)       Medications - No data to display         PROCEDURES:  None    FINAL IMPRESSION      1. Viral URI with cough    2. Encounter for laboratory testing for COVID-19 virus          DISPOSITION/ PLAN        I did discuss with Patient/patient's representative physical findings, vital signs, clinical data obtained and feel at this time the patient can be discharged to outpatient status with conservative management. I did discuss with patient at this time he did not meet or have risk factors for testing such as traveling out of the country over the past 14 days, known contacts with anybody with COVID-19 and due to the selective nature of testing at this time. The patient was told although the risk is low he needs to continue to monitor the patient was advised to drink plenty of fluids. They were also advised to isolate at home for up to 2 weeks and promote social distancing. They may take Tylenol for fever or body aches. Take prescribed medication as directed if prescribed. The patient may also take OTC cough and cold medication as needed.     Pt is advised to go to ER if symptoms worsen, new symptoms develop, high fever >100, chest pain or heaviness, breathing difficulty, lethargy to Dial 911 or call [facility] COVID-19 hotline number [phone] or your local [address] [place] [address]. The patient or patient's representative is agreeable to the treatment plan they're advised to follow-up with her primary care provider in one week for reevaluation.       PATIENT REFERRED TO:  Cheryle Kaiser, MD  [facility] / [address] [place] [address]  [id number]      DISCHARGE MEDICATIONS:  New Prescriptions    No medications on file       Discontinued Medications    No medications on file       Current Discharge Medication List          GLENNA Mckeon - CNP    (Please note that portions of this note were completed with a voice recognition program. Efforts were made to edit the dictations but occasionally words are mis-transcribed.)           GLENNA Mckeon CNP  11/21/20 5220

## 2020-11-21 NOTE — LETTER
NOTIFICATION RETURN TO WORK / SCHOOL    11/21/2020    Ms. [address]  [address] [place] [address] [address] [place] [address]      To Whom It May Concern:    Kody Monteiro was tested for COVID-19 on 11/21/2020    She may return to work in 3-4 days. I recommend:return without restrictions provided negative test results or per employee health. If there are questions or concerns, please have the patient contact our office.         Sincerely,  Electronically signed by Cherylene Brunet, APRN - CNP on 11/21/2020 at 12:00 PM      Cherylene Brunet, APRN - CNP

## 2020-11-23 ENCOUNTER — CARE COORDINATION (OUTPATIENT)
Dept: OTHER | Facility: CLINIC | Age: 50
End: 2020-11-23

## 2020-11-23 LAB
PERFORMING LAB: ABNORMAL
REPORT: ABNORMAL
SARS-COV-2: DETECTED

## 2020-11-23 RX ORDER — IBUPROFEN 200 MG
400 TABLET ORAL EVERY 6 HOURS PRN
COMMUNITY

## 2020-11-23 NOTE — CARE COORDINATION
[address] [facility] ED Follow Up Call    2020    Patient: Francisca Peck Patient : 1970   MRN: [id number]  Reason for Admission: Viral URI with cough, COVID testing  Discharge Date: 2020       Care Transitions ED Follow Up    Care Transitions Interventions  Schedule Follow Up Appointment with Physician:  Dave Butler you have any ongoing symptoms?:  Yes   Onset of Patient-reported symptoms: In the past 7 days   Patient-reported symptoms:  Cough, Fever, Pain, Fatigue, Other (Comment: body aches, diarrhea, chills, sweating)   Do you have a copy of your discharge instructions?:  Yes   Do you understand what to report and when to return?:  Yes   Are you following your discharge instructions?:  Yes   Do you have all of your prescriptions and are they filled?:  Yes   Have you scheduled your follow up appointment?:  No   Were you discharged with any Home Care or Post Acute Services or do you currently have any active services?:  No                 Patient contacted regarding COVID-19 exposure. Discussed COVID-19 related testing which was pending at this time. Test results were pending. Patient informed of results, if available? N/A- Results pending    Ambulatory Care Manager (ACM) contacted the patient by telephone to perform post discharge assessment. Call within 2 business days of discharge: Yes. Verified name and  with patient as identifiers. Provided introduction to self, and explanation of the CTN/ACM role, and reason for call due to risk factors for infection and/or exposure to COVID-19. Symptoms reviewed with patient who verbalized the following symptoms: fever, fatigue, pain or aching joints, chills or shaking, sweating, diarrhea, no new symptoms and no worsening symptoms. Due to no new or worsening symptoms encounter was not routed to provider for escalation. Discussed follow-up appointments.  If no appointment was previously scheduled, appointment scheduling offered: No- patient states she will call PCP after COVID results received  [facility] follow up appointment(s): No future appointments. Non-[facility] follow up appointment(s): None known    Non-face-to-face services provided:  Obtained and reviewed discharge summary and/or continuity of care documents  Assessment and support for treatment adherence and medication management-complete medication list reviewed with patient     Patient reports still with the following symptoms: cough, fever, body aches, fatigue, chills, sweating, decreased appetite, and diarrhea. States trying to increase fluid intake and ate some toast this am. ACM discussed importance of good nutrition and adequate fluid intake with patient. Discussed self-quarantine until COVID resulted. ACM notified patient that COVID result will show up in [address] once resulted. Discussed severe COVID symptoms with patient and Nurse Access Line for non-emergent ED visits. Advance Care Planning:   Does patient have an Advance Directive:  decision maker updated. Patient has following risk factors of: no known risk factors. CTN/ACM reviewed discharge instructions, medical action plan and red flags such as increased shortness of breath, increasing fever and signs of decompensation with patient who verbalized understanding. Discussed exposure protocols and quarantine with CDC Guidelines What to do if you are sick with coronavirus disease 2019.  Patient was given an opportunity for questions and concerns. The patient agrees to contact the [facility] exposure line [phone], [facility] department [place] Department [facility] Health: ([phone]) and PCP office for questions related to their healthcare. CTN/ACM provided contact information for future needs.     Reviewed and educated patient on any new and changed medications related to discharge diagnosis     Patient/family/caregiver given information for Suresh Evans and agrees to enroll yes  Patient's preferred e-mail: [email or web address]   Patient's preferred phone number: [phone]  Based on Loop alert triggers, patient will be contacted by nurse care manager for worsening symptoms. Pt will be further monitored by COVID Loop Team based on severity of symptoms and risk factors. Doe Morrison RN BSN  Associate Care Manager  Phone: [phone]  Email: [email or web address]

## 2020-11-24 ENCOUNTER — CARE COORDINATION (OUTPATIENT)
Dept: OTHER | Facility: CLINIC | Age: 50
End: 2020-11-24

## 2020-11-24 NOTE — CARE COORDINATION
Yellow alert noted in Loop remote symptom monitoring program. Messaged patient to notify Mallory Gamez if symptoms have worsened since yesterday.

## 2021-01-11 ENCOUNTER — HOSPITAL ENCOUNTER (OUTPATIENT)
Dept: MAMMOGRAPHY | Age: 51
Discharge: HOME | End: 2021-01-11
Payer: COMMERCIAL

## 2021-01-11 DIAGNOSIS — Z12.31 ENCOUNTER FOR SCREENING MAMMOGRAM FOR BREAST CANCER: ICD-10-CM

## 2021-01-11 PROCEDURE — 77063 BREAST TOMOSYNTHESIS BI: CPT

## 2021-08-18 LAB
CHOLESTEROL, TOTAL: 197 MG/DL (ref 0–199)
FASTING: YES
GLUCOSE BLD-MCNC: 80 MG/DL (ref 74–109)
HDLC SERPL-MCNC: 51 MG/DL (ref 40–90)
LDL CHOLESTEROL CALCULATED: 131 MG/DL
TRIGL SERPL-MCNC: 75 MG/DL (ref 0–199)

## 2021-09-23 ENCOUNTER — NURSE ONLY (OUTPATIENT)
Dept: LAB | Age: 51
End: 2021-09-23

## 2021-09-30 LAB — CYTOLOGY THIN PREP PAP: NORMAL

## 2021-11-04 ENCOUNTER — TELEPHONE (OUTPATIENT)
Dept: FAMILY MEDICINE CLINIC | Age: 51
End: 2021-11-04

## 2021-11-04 ENCOUNTER — HOSPITAL ENCOUNTER (EMERGENCY)
Age: 51
Discharge: HOME | End: 2021-11-04
Payer: COMMERCIAL

## 2021-11-04 VITALS
HEIGHT: 67 IN | TEMPERATURE: 98.8 F | WEIGHT: 150 LBS | SYSTOLIC BLOOD PRESSURE: 140 MMHG | OXYGEN SATURATION: 97 % | HEART RATE: 79 BPM | DIASTOLIC BLOOD PRESSURE: 72 MMHG | BODY MASS INDEX: 23.54 KG/M2 | RESPIRATION RATE: 18 BRPM

## 2021-11-04 DIAGNOSIS — R30.0 DYSURIA: Primary | ICD-10-CM

## 2021-11-04 LAB
BILIRUBIN URINE: NEGATIVE
BLOOD, URINE: NEGATIVE
CHARACTER, URINE: CLEAR
COLOR: YELLOW
GLUCOSE URINE: NEGATIVE MG/DL
KETONES, URINE: NEGATIVE
LEUKOCYTE ESTERASE, URINE: NEGATIVE
NITRITE, URINE: NEGATIVE
PH UA: 6.5 (ref 5–9)
PROTEIN UA: NEGATIVE MG/DL
SPECIFIC GRAVITY, URINE: 1.01 (ref 1–1.03)
UROBILINOGEN, URINE: 0.2 EU/DL (ref 0.2–1)

## 2021-11-04 PROCEDURE — 99213 OFFICE O/P EST LOW 20 MIN: CPT

## 2021-11-04 PROCEDURE — 81003 URINALYSIS AUTO W/O SCOPE: CPT

## 2021-11-04 PROCEDURE — 99213 OFFICE O/P EST LOW 20 MIN: CPT | Performed by: NURSE PRACTITIONER

## 2021-11-04 RX ORDER — SULFAMETHOXAZOLE AND TRIMETHOPRIM 800; 160 MG/1; MG/1
1 TABLET ORAL 2 TIMES DAILY
Qty: 10 TABLET | Refills: 0 | Status: SHIPPED | OUTPATIENT
Start: 2021-11-04 | End: 2021-11-09

## 2021-11-04 ASSESSMENT — ENCOUNTER SYMPTOMS
ABDOMINAL PAIN: 0
NAUSEA: 0
SHORTNESS OF BREATH: 0
COUGH: 0
VOMITING: 0
BACK PAIN: 0

## 2021-11-04 NOTE — ED PROVIDER NOTES
[address]  Urgent Care Encounter       CHIEF COMPLAINT       Chief Complaint   Patient presents with    Dysuria       Nurses Notes reviewed and I agree except as noted in the HPI. HISTORY OF PRESENT ILLNESS   Jennifer Prado is a 46 y.o. female who presents with complaints of urinary urgency and frequency, onset yesterday. She also has some mild burning at the end of urination. She denies any back pain, abdominal pain, flank pain, fever, chills, nausea or vomiting. Does report history of UTIs. The history is provided by the patient. REVIEW OF SYSTEMS     Review of Systems   Constitutional: Negative for chills and fever. Respiratory: Negative for cough and shortness of breath. Gastrointestinal: Negative for abdominal pain, nausea and vomiting. Genitourinary: Positive for dysuria, frequency and urgency. Negative for flank pain and hematuria. Musculoskeletal: Negative for back pain. Neurological: Negative for dizziness and headaches. PAST MEDICAL HISTORY         Diagnosis Date    Allergic rhinitis     Headache(784.0)     Pyelonephritis        SURGICALHISTORY     Patient  has a past surgical history that includes Tonsillectomy and adenoidectomy and Cholecystectomy.     CURRENT MEDICATIONS       Discharge Medication List as of 11/4/2021  4:28 PM      CONTINUE these medications which have NOT CHANGED    Details   Levocetirizine Dihydrochloride (XYZAL PO) Take by mouthHistorical Med      Multiple Vitamin (MULTIVITAMIN PO) Take 1 tablet by mouth dailyHistorical Med      VITAMIN D PO Take 1 tablet by mouth dailyHistorical Med      Acetaminophen (TYLENOL PO) Take 2 tablets by mouth every 4 hours as needed (fever or pain)Historical Med      ibuprofen (ADVIL;MOTRIN) 200 MG tablet Take 400 mg by mouth every 6 hours as needed for Pain or FeverHistorical Med      Loratadine-Pseudoephedrine (CLARITIN-D 24 HOUR PO) Take by mouth daily as needed (allergy symptoms) Takes [address] [facility]     Patient is has No Known Allergies. Patients   Immunization History   Administered Date(s) Administered    Influenza Virus Vaccine 10/10/2017, 10/22/2018, 10/14/2019, 10/12/2020    Influenza, Otis Jose, IM, (6 mo and older Fluzone, Flulaval, Fluarix and 3 yrs and older Afluria) 09/30/2016    Td, unspecified formulation 03/28/2003       FAMILY HISTORY     Patient's family history is not on file. SOCIAL HISTORY     Patient  reports that she has never smoked. She has never used smokeless tobacco. She reports that she does not drink alcohol and does not use drugs. PHYSICAL EXAM     ED TRIAGE VITALS  BP: (!) 140/72, Temp: 98.8 °F (37.1 °C), Pulse: 79, Resp: 18, SpO2: 97 %,Estimated body mass index is 23.49 kg/m² as calculated from the following:    Height as of this encounter: 5' 7\" (1.702 m). Weight as of this encounter: 150 lb (68 kg). ,Patient's last menstrual period was 01/07/2019 (exact date). Physical Exam  Vitals and nursing note reviewed. Constitutional:       General: She is not in acute distress. Appearance: She is well-developed. HENT:      Head: Normocephalic and atraumatic. Cardiovascular:      Rate and Rhythm: Normal rate and regular rhythm. Heart sounds: Normal heart sounds. Pulmonary:      Effort: Pulmonary effort is normal. No respiratory distress. Breath sounds: Normal breath sounds. Abdominal:      General: Bowel sounds are normal.      Palpations: Abdomen is soft. Tenderness: There is no abdominal tenderness. There is no right CVA tenderness or left CVA tenderness. Skin:     General: Skin is warm and dry. Neurological:      General: No focal deficit present. Mental Status: She is alert and oriented to person, place, and time. Psychiatric:         Mood and Affect: Mood normal.         Speech: Speech normal.         Behavior: Behavior normal. Behavior is cooperative.          DIAGNOSTIC RESULTS     Labs:No results found for this visit on 11/04/21. IMAGING:    No orders to display         EKG:      URGENT CARE COURSE:     Vitals:    11/04/21 1606   BP: (!) 140/72   Pulse: 79   Resp: 18   Temp: 98.8 °F (37.1 °C)   SpO2: 97%   Weight: 150 lb (68 kg)   Height: 5' 7\" (1.702 m)       Medications - No data to display         PROCEDURES:  None    FINAL IMPRESSION      1. Dysuria          DISPOSITION/ PLAN     Patient presents with complaints of dysuria. UA not suggestive of acute urinary tract infection. Patient has had multiple UTIs in the past.  She will be given a prescription for Bactrim DS and structured to hold onto this prescription for and not fill unless symptoms will become continue or become worse with the next 48 to 72 hours. Patient also thinks she may have interstitial cystitis. Future follow-up family doctor regarding this concern. Further instructions were outlined verbally and in the patient's discharge instructions. All the patient's questions were answered. The patient/parent agreed with the plan and was discharged from the [facility] in good condition.       PATIENT REFERRED TO:  MD Sangeeta Mi [address] [place] [id number]      DISCHARGE MEDICATIONS:  Discharge Medication List as of 11/4/2021  4:28 PM      START taking these medications    Details   sulfamethoxazole-trimethoprim (BACTRIM DS;SEPTRA DS) 800-160 MG per tablet Take 1 tablet by mouth 2 times daily for 5 days, Disp-10 tablet, R-0Print             Discharge Medication List as of 11/4/2021  4:28 PM          Discharge Medication List as of 11/4/2021  4:28 PM          GLENNA Martin CNP    (Please note that portions of this note were completed with a voice recognition program. Efforts were made to edit the dictations but occasionally words are mis-transcribed.)         GLENNA Martin CNP  11/04/21 6953

## 2021-11-04 NOTE — TELEPHONE ENCOUNTER
Jocy Elizondo calls for appointment today for UTI, no appointments available , offered walk in clinic , declined.

## 2022-02-21 ENCOUNTER — HOSPITAL ENCOUNTER (OUTPATIENT)
Dept: MAMMOGRAPHY | Age: 52
Discharge: HOME | End: 2022-02-21
Payer: COMMERCIAL

## 2022-02-21 DIAGNOSIS — Z12.39 BREAST SCREENING: ICD-10-CM

## 2022-02-21 PROCEDURE — 77063 BREAST TOMOSYNTHESIS BI: CPT

## 2022-05-13 ENCOUNTER — OFFICE VISIT (OUTPATIENT)
Dept: FAMILY MEDICINE CLINIC | Age: 52
End: 2022-05-13
Payer: COMMERCIAL

## 2022-05-13 VITALS
OXYGEN SATURATION: 98 % | SYSTOLIC BLOOD PRESSURE: 122 MMHG | HEART RATE: 81 BPM | WEIGHT: 163 LBS | DIASTOLIC BLOOD PRESSURE: 72 MMHG | HEIGHT: 67 IN | TEMPERATURE: 99.1 F | BODY MASS INDEX: 25.58 KG/M2

## 2022-05-13 DIAGNOSIS — R12 HEARTBURN: ICD-10-CM

## 2022-05-13 DIAGNOSIS — Z00.00 ANNUAL PHYSICAL EXAM: Primary | ICD-10-CM

## 2022-05-13 PROCEDURE — 99396 PREV VISIT EST AGE 40-64: CPT | Performed by: FAMILY MEDICINE

## 2022-05-13 RX ORDER — FAMOTIDINE 40 MG/1
40 TABLET, FILM COATED ORAL
Qty: 30 TABLET | Refills: 5 | Status: SHIPPED | OUTPATIENT
Start: 2022-05-13 | End: 2022-09-12

## 2022-05-13 SDOH — ECONOMIC STABILITY: FOOD INSECURITY: WITHIN THE PAST 12 MONTHS, YOU WORRIED THAT YOUR FOOD WOULD RUN OUT BEFORE YOU GOT MONEY TO BUY MORE.: NEVER TRUE

## 2022-05-13 SDOH — ECONOMIC STABILITY: FOOD INSECURITY: WITHIN THE PAST 12 MONTHS, THE FOOD YOU BOUGHT JUST DIDN'T LAST AND YOU DIDN'T HAVE MONEY TO GET MORE.: NEVER TRUE

## 2022-05-13 ASSESSMENT — PATIENT HEALTH QUESTIONNAIRE - PHQ9
SUM OF ALL RESPONSES TO PHQ9 QUESTIONS 1 & 2: 0
SUM OF ALL RESPONSES TO PHQ QUESTIONS 1-9: 0
2. FEELING DOWN, DEPRESSED OR HOPELESS: 0
SUM OF ALL RESPONSES TO PHQ QUESTIONS 1-9: 0
1. LITTLE INTEREST OR PLEASURE IN DOING THINGS: 0

## 2022-05-13 ASSESSMENT — SOCIAL DETERMINANTS OF HEALTH (SDOH): HOW HARD IS IT FOR YOU TO PAY FOR THE VERY BASICS LIKE FOOD, HOUSING, MEDICAL CARE, AND HEATING?: NOT HARD AT ALL

## 2022-05-13 NOTE — PROGRESS NOTES
Aura Anand (:  1970) is a 45 y.o. female,Established patient, here for evaluation of the following chief complaint(s):  Established [facility])         ASSESSMENT/PLAN:  1. Annual physical exam  -     Lipid, Fasting; Future  -     Glucose, Fasting; Future  2. Heartburn  -     famotidine (PEPCID) 40 MG tablet; Take 1 tablet by mouth every morning (before breakfast), Disp-30 tablet, R-5Normal    Patient healthy in general with healthy lifestyle. Currently having some reflux troubles. We will see if we can control these as she continues to make some changes in her diet. We will try Pepcid in the morning and as she feels well for a good month, she may decrease use. If Pepcid is not sufficient I recommend that we do a PPI. Again continue diet modifications may be helpful. If PPI therapy is needed beyond 3 months then recommend that she have a GI consultation. Return in about 1 year (around 2023). Subjective   SUBJECTIVE/OBJECTIVE:  HPI  And I am meeting for the first time. Overall she is doing okay. 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 She is thankful to be past those difficult winter months. He has noticed some stomach issues lately. Reflux, comes and goes. However seems to be more consistent  for a month if she describes her stomach will feel crazy -- burping, gassy and bloated. She does not wake up with it but occurs after breakfast and may last until supper. EGD 20 years ago. No trigger specifically that she can find in the foods. Stress had been high because of work situation. She had not been sleeping well but that seems to be better now. Probiotics OTC were tried. Apple cider vinegar. Now more calm now. Sleeping about 6 hours. A bit tired at times when wakes up. Menopausal last period 43 yo.   Hot flashes are better now than the last 2 years previously  Exercising and eating healthy (protein and vegetables. Low carb approach. Minimal snacks). wasabi flavor. Unfortunately she had some weight gain. 5 grandchildren. 3 children. Son is Vilma Janet Donny. Good BMs, colonoscopy -- divertiulosiss, 10 years. Activia yogurt. Allergic rhinitis -- OTC xyzal helping . Migraines less now. H/o elevated BP 11/2022 hospitival visit for dysuria. He has been doing well. 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     Resolved Ambulatory Problems     Diagnosis Date Noted    No Resolved Ambulatory Problems     Past Medical History:   Diagnosis Date    Allergic rhinitis     Headache(784.0)     Pyelonephritis        Past Medical History:   Diagnosis Date    Allergic rhinitis     Headache(784.0)     Pyelonephritis        Past Surgical History:   Procedure Laterality Date    CHOLECYSTECTOMY      TONSILLECTOMY AND ADENOIDECTOMY         Social History     Socioeconomic History    Marital status:      Spouse name: Jose Martin Joseph Number of children: Not on file    Years of education: Not on file    Highest education level: Not on file   Occupational History    Not on file   Tobacco Use    Smoking status: Never Smoker    Smokeless tobacco: Never Used   Substance and Sexual Activity    Alcohol use: Never    Drug use: No    Sexual activity: Not on file   Other Topics Concern    Not on file   Social History Narrative    Not on file     Social Determinants of Health     Financial Resource Strain: Low Risk     Difficulty of Paying Living Expenses: Not hard at all   Food Insecurity: No Food Insecurity    Worried About [address] in the Last Year: Never true    [address] in the Last Year: Never true   Transportation Needs:     Lack of Transportation (Medical): Not on file    Lack of Transportation (Non-Medical):  Not on file   Physical Activity:     Days of Exercise per Week: Not on file    Minutes of Exercise per Session: Not on file   Stress:     Feeling of Stress : Not on file   Social Connections:     Frequency of Communication with Friends and Family: Not on file    Frequency of Social Gatherings with Friends and Family: Not on file    Attends Christian Services: Not on file    Active Member of Clubs or Organizations: Not on file    Attends Club or Organization Meetings: Not on file    Marital Status: Not on file   Intimate Partner Violence:     Fear of Current or Ex-Partner: Not on file    Emotionally Abused: Not on file    Physically Abused: Not on file    Sexually Abused: Not on file   Housing Stability:     Unable to Pay for Housing in the Last Year: Not on file    Number of [place] in the Last Year: Not on file    Unstable Housing in the Last Year: Not on file       History reviewed. No pertinent family history. Review of Systems   Constitutional: Negative for fatigue and fever. Respiratory: Negative for shortness of breath. Cardiovascular: Negative for chest pain and leg swelling. Objective      Physical Exam  Constitutional:       General: She is not in acute distress. Appearance: Normal appearance. She is not ill-appearing. Cardiovascular:      Rate and Rhythm: Normal rate and regular rhythm. Heart sounds: No murmur heard. Pulmonary:      Effort: Pulmonary effort is normal. No respiratory distress. Breath sounds: Normal breath sounds. No wheezing. Musculoskeletal:         General: No swelling. Neurological:      Mental Status: She is alert and oriented to person, place, and time.    Psychiatric:         Mood and Affect: Mood normal.         Lab Results   Component Value Date     06/29/2019    K 3.9 06/29/2019     06/29/2019    CO2 22 (L) 06/29/2019    BUN 13 06/29/2019    CREATININE 0.8 06/29/2019    CALCIUM 8.7 06/29/2019    GLUCOSE 80 08/18/2021        Lab Results   Component Value Date CHOL 197 08/18/2021    CHOL 161 04/26/2019    CHOL 151 04/12/2018     Lab Results   Component Value Date    TRIG 75 08/18/2021    TRIG 44 04/26/2019    TRIG 65 04/12/2018     Lab Results   Component Value Date    HDL 51 08/18/2021    HDL 51 04/26/2019    HDL 48 04/12/2018     Lab Results   Component Value Date    LDLCALC 131 08/18/2021    LDLCALC 101 04/26/2019    LDLCALC 90 04/12/2018     No results found for: LABVLDL, VLDL  No results found for: CHOLHDLRATIO    No results found for: Javier Crown    No results found for: TSH   No results found for: QBYQCVCQ20   No results found for: MG   Lab Results   Component Value Date    ALT 8 (L) 06/29/2019    AST 17 06/29/2019    ALKPHOS 62 06/29/2019    BILITOT 0.6 06/29/2019    BILIDIR <0.2 06/29/2019        Lab Results   Component Value Date    WBC 11.0 (H) 06/29/2019    HGB 13.3 06/29/2019    HCT 38.9 06/29/2019    MCV 97.5 06/29/2019     06/29/2019       An electronic signature was used to authenticate this note.     --Get Saab MD

## 2022-05-13 NOTE — PATIENT INSTRUCTIONS
Kefir 1/2 cup + flax seed meal 1 Tbsp + 1/2 cup fruit (optional) -- daily for 1 week. If not getting good enough results with digestion, then double up on Kefir and flax seed meal.   Once you have obtained a couple of weeks of healthier digestive and intestinal results   then use 3-4 times a week. If having too many bowel movements a day, then may decrease amount or frequency further.

## 2022-05-19 ASSESSMENT — ENCOUNTER SYMPTOMS: SHORTNESS OF BREATH: 0

## 2022-06-15 ENCOUNTER — HOSPITAL ENCOUNTER (OUTPATIENT)
Age: 52
Discharge: HOME | End: 2022-06-15
Payer: COMMERCIAL

## 2022-06-15 DIAGNOSIS — Z00.00 ANNUAL PHYSICAL EXAM: ICD-10-CM

## 2022-06-15 LAB
CHOLESTEROL, FASTING: 196 MG/DL (ref 100–199)
GLUCOSE FASTING: 82 MG/DL (ref 70–108)
HDLC SERPL-MCNC: 50 MG/DL
LDL CHOLESTEROL CALCULATED: 132 MG/DL
TRIGLYCERIDE, FASTING: 72 MG/DL (ref 0–199)

## 2022-06-15 PROCEDURE — 36415 COLL VENOUS BLD VENIPUNCTURE: CPT

## 2022-06-15 PROCEDURE — 82947 ASSAY GLUCOSE BLOOD QUANT: CPT

## 2022-06-15 PROCEDURE — 80061 LIPID PANEL: CPT

## 2022-09-12 DIAGNOSIS — R12 HEARTBURN: ICD-10-CM

## 2022-09-12 RX ORDER — FAMOTIDINE 40 MG/1
TABLET, FILM COATED ORAL
Qty: 90 TABLET | Refills: 1 | Status: SHIPPED | OUTPATIENT
Start: 2022-09-12

## 2022-09-12 NOTE — TELEPHONE ENCOUNTER
Juana Wright is requesting a refill on the following medications:  Requested Prescriptions     Pending Prescriptions Disp Refills    famotidine (PEPCID) 40 MG tablet [Pharmacy Med Name: FAMOTIDINE 40MG TABS] 30 tablet 5     Sig: TAKE 1 TABLET BY MOUTH EVERY MORNING BEFORE BREAKFAST.        Date of last visit: 5/13/2022  Date of next visit (if applicable):5/15/2023  Date of last refill: 5/13/2022  Pharmacy Name: [facility],  Raymond Diggs MA

## 2023-02-24 ENCOUNTER — HOSPITAL ENCOUNTER (OUTPATIENT)
Dept: MAMMOGRAPHY | Age: 53
Discharge: HOME | End: 2023-02-24
Payer: COMMERCIAL

## 2023-02-24 DIAGNOSIS — Z12.31 VISIT FOR SCREENING MAMMOGRAM: ICD-10-CM

## 2023-02-24 PROCEDURE — 77063 BREAST TOMOSYNTHESIS BI: CPT

## 2023-03-15 DIAGNOSIS — R12 HEARTBURN: ICD-10-CM

## 2023-03-15 RX ORDER — FAMOTIDINE 40 MG/1
TABLET, FILM COATED ORAL
Qty: 90 TABLET | Refills: 0 | Status: SHIPPED | OUTPATIENT
Start: 2023-03-15 | End: 2023-05-15 | Stop reason: SDUPTHER

## 2023-05-15 ENCOUNTER — OFFICE VISIT (OUTPATIENT)
Dept: FAMILY MEDICINE CLINIC | Age: 53
End: 2023-05-15
Payer: COMMERCIAL

## 2023-05-15 VITALS
DIASTOLIC BLOOD PRESSURE: 82 MMHG | WEIGHT: 160 LBS | HEART RATE: 70 BPM | HEIGHT: 67 IN | SYSTOLIC BLOOD PRESSURE: 144 MMHG | TEMPERATURE: 97.8 F | BODY MASS INDEX: 25.11 KG/M2 | OXYGEN SATURATION: 97 %

## 2023-05-15 DIAGNOSIS — R12 HEARTBURN: ICD-10-CM

## 2023-05-15 DIAGNOSIS — Z00.00 ANNUAL PHYSICAL EXAM: Primary | ICD-10-CM

## 2023-05-15 PROCEDURE — 99396 PREV VISIT EST AGE 40-64: CPT | Performed by: FAMILY MEDICINE

## 2023-05-15 RX ORDER — FAMOTIDINE 40 MG/1
40 TABLET, FILM COATED ORAL
Qty: 90 TABLET | Refills: 3 | Status: SHIPPED | OUTPATIENT
Start: 2023-05-15

## 2023-05-15 SDOH — ECONOMIC STABILITY: HOUSING INSECURITY
IN THE LAST 12 MONTHS, WAS THERE A TIME WHEN YOU DID NOT HAVE A STEADY PLACE TO SLEEP OR SLEPT IN A SHELTER (INCLUDING NOW)?: NO

## 2023-05-15 SDOH — ECONOMIC STABILITY: FOOD INSECURITY: WITHIN THE PAST 12 MONTHS, THE FOOD YOU BOUGHT JUST DIDN'T LAST AND YOU DIDN'T HAVE MONEY TO GET MORE.: NEVER TRUE

## 2023-05-15 SDOH — ECONOMIC STABILITY: FOOD INSECURITY: WITHIN THE PAST 12 MONTHS, YOU WORRIED THAT YOUR FOOD WOULD RUN OUT BEFORE YOU GOT MONEY TO BUY MORE.: NEVER TRUE

## 2023-05-15 SDOH — ECONOMIC STABILITY: INCOME INSECURITY: HOW HARD IS IT FOR YOU TO PAY FOR THE VERY BASICS LIKE FOOD, HOUSING, MEDICAL CARE, AND HEATING?: NOT HARD AT ALL

## 2023-05-15 ASSESSMENT — ENCOUNTER SYMPTOMS
NAUSEA: 0
ABDOMINAL PAIN: 0
VOMITING: 0
COUGH: 0
WHEEZING: 0
BACK PAIN: 0
SINUS PRESSURE: 0
SHORTNESS OF BREATH: 0

## 2023-05-15 ASSESSMENT — PATIENT HEALTH QUESTIONNAIRE - PHQ9
SUM OF ALL RESPONSES TO PHQ QUESTIONS 1-9: 0
SUM OF ALL RESPONSES TO PHQ QUESTIONS 1-9: 0
1. LITTLE INTEREST OR PLEASURE IN DOING THINGS: 0
SUM OF ALL RESPONSES TO PHQ9 QUESTIONS 1 & 2: 0
2. FEELING DOWN, DEPRESSED OR HOPELESS: 0
SUM OF ALL RESPONSES TO PHQ QUESTIONS 1-9: 0
SUM OF ALL RESPONSES TO PHQ QUESTIONS 1-9: 0

## 2023-05-15 NOTE — PROGRESS NOTES
Topic Date Due    HIV screen  Never done    Hepatitis C screen  Never done    Shingles vaccine (1 of 2) Never done    COVID-19 Vaccine (2 - Moderna series) 12/10/2021       BP Readings from Last 3 Encounters:   05/15/23 (!) 144/82   05/13/22 122/72   11/04/21 (!) 140/72       Wt Readings from Last 3 Encounters:   05/15/23 160 lb (72.6 kg)   05/13/22 163 lb (73.9 kg)   11/04/21 150 lb (68 kg)       Review of Systems   Constitutional:  Negative for fatigue and fever. HENT:  Negative for congestion, ear pain, postnasal drip and sinus pressure. Eyes:  Negative for visual disturbance. Respiratory:  Negative for cough, shortness of breath and wheezing. Cardiovascular:  Negative for chest pain and leg swelling. Gastrointestinal:  Negative for abdominal pain, nausea and vomiting. Musculoskeletal:  Negative for arthralgias and back pain. Skin:  Negative for rash. Neurological:  Positive for headaches (once in a while, excedrin helps, like migraine). Negative for dizziness. Psychiatric/Behavioral:  Negative for decreased concentration. Objective   Physical Exam  Constitutional:       General: She is not in acute distress. Appearance: Normal appearance. She is not ill-appearing. HENT:      Head: Normocephalic. Right Ear: Tympanic membrane, ear canal and external ear normal.      Left Ear: Tympanic membrane, ear canal and external ear normal.      Nose: No congestion. Mouth/Throat:      Mouth: Mucous membranes are moist.      Pharynx: Oropharynx is clear. No posterior oropharyngeal erythema. Eyes:      General:         Right eye: No discharge. Left eye: No discharge. Extraocular Movements: Extraocular movements intact. Conjunctiva/sclera: Conjunctivae normal.      Pupils: Pupils are equal, round, and reactive to light. Cardiovascular:      Rate and Rhythm: Normal rate and regular rhythm. Heart sounds: Normal heart sounds. No murmur heard.   Pulmonary:

## 2024-02-29 ENCOUNTER — HOSPITAL ENCOUNTER (OUTPATIENT)
Dept: MAMMOGRAPHY | Age: 54
Discharge: HOME | End: 2024-02-29
Payer: COMMERCIAL

## 2024-02-29 VITALS — BODY MASS INDEX: 25.12 KG/M2 | WEIGHT: 160.05 LBS | HEIGHT: 67 IN

## 2024-02-29 DIAGNOSIS — Z12.31 VISIT FOR SCREENING MAMMOGRAM: ICD-10-CM

## 2024-02-29 PROCEDURE — 77063 BREAST TOMOSYNTHESIS BI: CPT

## 2024-03-21 ENCOUNTER — HOSPITAL ENCOUNTER (OUTPATIENT)
Dept: WOMENS IMAGING | Age: 54
Discharge: HOME | End: 2024-03-21
Attending: FAMILY MEDICINE
Payer: COMMERCIAL

## 2024-03-21 DIAGNOSIS — R92.30 INCONCLUSIVE MAMMOGRAPHY DUE TO DENSE BREASTS: ICD-10-CM

## 2024-03-21 DIAGNOSIS — R92.2 INCONCLUSIVE MAMMOGRAPHY DUE TO DENSE BREASTS: ICD-10-CM

## 2024-03-21 PROCEDURE — 76641 ULTRASOUND BREAST COMPLETE: CPT

## 2024-05-20 ENCOUNTER — OFFICE VISIT (OUTPATIENT)
Dept: FAMILY MEDICINE CLINIC | Age: 54
End: 2024-05-20
Payer: COMMERCIAL

## 2024-05-20 VITALS
SYSTOLIC BLOOD PRESSURE: 130 MMHG | WEIGHT: 161.8 LBS | HEIGHT: 67 IN | RESPIRATION RATE: 16 BRPM | BODY MASS INDEX: 25.39 KG/M2 | DIASTOLIC BLOOD PRESSURE: 82 MMHG | HEART RATE: 72 BPM | OXYGEN SATURATION: 97 % | TEMPERATURE: 97.9 F

## 2024-05-20 DIAGNOSIS — R12 HEARTBURN: ICD-10-CM

## 2024-05-20 DIAGNOSIS — Z00.00 HEALTHCARE MAINTENANCE: ICD-10-CM

## 2024-05-20 DIAGNOSIS — Z00.00 ANNUAL PHYSICAL EXAM: Primary | ICD-10-CM

## 2024-05-20 PROCEDURE — 99396 PREV VISIT EST AGE 40-64: CPT | Performed by: FAMILY MEDICINE

## 2024-05-20 RX ORDER — FAMOTIDINE 40 MG/1
40 TABLET, FILM COATED ORAL
Qty: 90 TABLET | Refills: 3 | Status: SHIPPED | OUTPATIENT
Start: 2024-05-20

## 2024-05-20 SDOH — ECONOMIC STABILITY: FOOD INSECURITY: WITHIN THE PAST 12 MONTHS, THE FOOD YOU BOUGHT JUST DIDN'T LAST AND YOU DIDN'T HAVE MONEY TO GET MORE.: NEVER TRUE

## 2024-05-20 SDOH — ECONOMIC STABILITY: FOOD INSECURITY: WITHIN THE PAST 12 MONTHS, YOU WORRIED THAT YOUR FOOD WOULD RUN OUT BEFORE YOU GOT MONEY TO BUY MORE.: NEVER TRUE

## 2024-05-20 SDOH — ECONOMIC STABILITY: INCOME INSECURITY: HOW HARD IS IT FOR YOU TO PAY FOR THE VERY BASICS LIKE FOOD, HOUSING, MEDICAL CARE, AND HEATING?: NOT HARD AT ALL

## 2024-05-20 ASSESSMENT — PATIENT HEALTH QUESTIONNAIRE - PHQ9
2. FEELING DOWN, DEPRESSED OR HOPELESS: NOT AT ALL
SUM OF ALL RESPONSES TO PHQ QUESTIONS 1-9: 0
1. LITTLE INTEREST OR PLEASURE IN DOING THINGS: NOT AT ALL
SUM OF ALL RESPONSES TO PHQ9 QUESTIONS 1 & 2: 0
SUM OF ALL RESPONSES TO PHQ QUESTIONS 1-9: 0

## 2024-05-20 ASSESSMENT — ENCOUNTER SYMPTOMS: SHORTNESS OF BREATH: 0

## 2024-05-20 NOTE — PROGRESS NOTES
Well Adult Note  Name: Julia Myers Today’s Date: 2024   MRN: [id number] Sex: Female   Age: 54 y.o. Ethnicity: Non- / Non    : 1970 Race: White (non-)      Julia Myers is here for well adult exam.  Chief Complaint   Patient presents with    Annual Exam     Pt has paper from [facility] to be filled out.  Pt denies any concerns today.       Overall doing well.    GERD -- pepcid use in am basically daily.  No major changes.  Probiotic foods like Kefir had helped bloating trouble when she continues this.  EGD over 20 years ago.     BP some borderline numbers noted off and on.  It is doing well today.  I encouraged the patient to check her blood pressure outside of the clinic.  Mother had some blood pressure issues starting about this time    Eye exam --  up to date, contacts  Dental -- up to date  Diet -- works to get vegetables, protein (meat, eggs), maybe some oats with macca/nuts.   Exercise -- lifting weight, bike/peloton  Sleep --  melatonin 10 mg    Health Maintenance Due   Topic Date Due    Hepatitis B vaccine (1 of 3 - 3-dose series) Never done    HIV screen  Never done    Shingles vaccine (1 of 2) Never done    COVID-19 Vaccine (2 - 2023-24 season) 2023    Depression Screen  05/15/2024     BP Readings from Last 10 Encounters:   24 130/82   05/15/23 (!) 144/82   22 122/72   21 (!) 140/72   20 137/88   20 116/84   19 118/62   19 90/60   19 129/81   16 122/60     Wt Readings from Last 10 Encounters:   24 73.4 kg (161 lb 12.8 oz)   24 72.6 kg (160 lb 0.9 oz)   05/15/23 72.6 kg (160 lb)   22 73.9 kg (163 lb)   21 68 kg (150 lb)   20 68 kg (150 lb)   20 70.8 kg (156 lb)   19 68.9 kg (151 lb 12.8 oz)   19 68.6 kg (151 lb 3.2 oz)   19 65.8 kg (145 lb)       Review of Systems   Constitutional:  Negative for fatigue and fever.   Respiratory:  Negative for

## 2024-05-20 NOTE — PATIENT INSTRUCTIONS
Check out \"Women Are Not Small Men\" by Annette arreguin    [email or web address]

## 2024-05-24 ENCOUNTER — HOSPITAL ENCOUNTER (OUTPATIENT)
Age: 54
Discharge: HOME | End: 2024-05-24
Payer: COMMERCIAL

## 2024-05-24 DIAGNOSIS — Z00.00 ANNUAL PHYSICAL EXAM: ICD-10-CM

## 2024-05-24 DIAGNOSIS — Z00.00 HEALTHCARE MAINTENANCE: ICD-10-CM

## 2024-05-24 LAB
CHOLEST SERPL-MCNC: 182 MG/DL (ref 100–199)
GLUCOSE FASTING: 89 MG/DL (ref 70–108)
HDLC SERPL-MCNC: 44 MG/DL
LDLC SERPL CALC-MCNC: 127 MG/DL
TRIGL SERPL-MCNC: 56 MG/DL (ref 0–199)

## 2024-05-24 PROCEDURE — 82947 ASSAY GLUCOSE BLOOD QUANT: CPT

## 2024-05-24 PROCEDURE — 36415 COLL VENOUS BLD VENIPUNCTURE: CPT

## 2024-05-24 PROCEDURE — 80061 LIPID PANEL: CPT

## 2024-05-28 ENCOUNTER — TELEPHONE (OUTPATIENT)
Dept: FAMILY MEDICINE CLINIC | Age: 54
End: 2024-05-28

## 2024-05-28 NOTE — TELEPHONE ENCOUNTER
----- Message from Sara Carrillo MD sent at 5/28/2024  8:50 AM EDT -----  Please complete her [facility] form and fax to appropriate number for completion. Thank you.

## 2024-10-07 ENCOUNTER — LAB (OUTPATIENT)
Dept: LAB | Age: 54
End: 2024-10-07

## 2024-10-17 LAB — CYTOLOGY THIN PREP PAP: NORMAL

## 2025-04-03 ENCOUNTER — HOSPITAL ENCOUNTER (OUTPATIENT)
Dept: MAMMOGRAPHY | Age: 55
Discharge: HOME | End: 2025-04-03
Payer: COMMERCIAL

## 2025-04-03 DIAGNOSIS — Z12.39 SCREENING BREAST EXAMINATION: ICD-10-CM

## 2025-04-03 PROCEDURE — 77063 BREAST TOMOSYNTHESIS BI: CPT

## 2025-06-02 ENCOUNTER — OFFICE VISIT (OUTPATIENT)
Dept: FAMILY MEDICINE CLINIC | Age: 55
End: 2025-06-02
Payer: COMMERCIAL

## 2025-06-02 VITALS
DIASTOLIC BLOOD PRESSURE: 84 MMHG | BODY MASS INDEX: 25.65 KG/M2 | WEIGHT: 163.4 LBS | SYSTOLIC BLOOD PRESSURE: 128 MMHG | OXYGEN SATURATION: 92 % | HEART RATE: 77 BPM | HEIGHT: 67 IN

## 2025-06-02 DIAGNOSIS — G89.29 CHRONIC RIGHT SHOULDER PAIN: ICD-10-CM

## 2025-06-02 DIAGNOSIS — R12 HEARTBURN: ICD-10-CM

## 2025-06-02 DIAGNOSIS — M25.511 CHRONIC RIGHT SHOULDER PAIN: ICD-10-CM

## 2025-06-02 DIAGNOSIS — Z00.00 ENCOUNTER FOR WELL ADULT EXAM WITHOUT ABNORMAL FINDINGS: Primary | ICD-10-CM

## 2025-06-02 DIAGNOSIS — R07.81 RIB PAIN ON RIGHT SIDE: ICD-10-CM

## 2025-06-02 PROCEDURE — 99396 PREV VISIT EST AGE 40-64: CPT | Performed by: FAMILY MEDICINE

## 2025-06-02 RX ORDER — FAMOTIDINE 40 MG/1
40 TABLET, FILM COATED ORAL
Qty: 90 TABLET | Refills: 3 | Status: SHIPPED | OUTPATIENT
Start: 2025-06-02

## 2025-06-02 RX ORDER — LEVOCETIRIZINE DIHYDROCHLORIDE 5 MG/1
5 TABLET, FILM COATED ORAL NIGHTLY
COMMUNITY

## 2025-06-02 RX ORDER — MELOXICAM 15 MG/1
15 TABLET ORAL DAILY
Qty: 30 TABLET | Refills: 0 | Status: SHIPPED | OUTPATIENT
Start: 2025-06-02

## 2025-06-02 SDOH — ECONOMIC STABILITY: FOOD INSECURITY: WITHIN THE PAST 12 MONTHS, THE FOOD YOU BOUGHT JUST DIDN'T LAST AND YOU DIDN'T HAVE MONEY TO GET MORE.: NEVER TRUE

## 2025-06-02 SDOH — ECONOMIC STABILITY: FOOD INSECURITY: WITHIN THE PAST 12 MONTHS, YOU WORRIED THAT YOUR FOOD WOULD RUN OUT BEFORE YOU GOT MONEY TO BUY MORE.: NEVER TRUE

## 2025-06-02 ASSESSMENT — ENCOUNTER SYMPTOMS
DIARRHEA: 0
CONSTIPATION: 0
SHORTNESS OF BREATH: 0

## 2025-06-02 ASSESSMENT — PATIENT HEALTH QUESTIONNAIRE - PHQ9
1. LITTLE INTEREST OR PLEASURE IN DOING THINGS: NOT AT ALL
SUM OF ALL RESPONSES TO PHQ QUESTIONS 1-9: 0
SUM OF ALL RESPONSES TO PHQ QUESTIONS 1-9: 0
2. FEELING DOWN, DEPRESSED OR HOPELESS: NOT AT ALL
SUM OF ALL RESPONSES TO PHQ QUESTIONS 1-9: 0
SUM OF ALL RESPONSES TO PHQ QUESTIONS 1-9: 0

## 2025-06-02 NOTE — PROGRESS NOTES
0.5mL 09/30/2016    Td, unspecified formulation 03/28/2003        Health Maintenance Due   Topic Date Due    HIV screen  Never done    Hepatitis B vaccine (1 of 3 - 19+ 3-dose series) Never done    Shingles vaccine (1 of 2) Never done    Pneumococcal 50+ years Vaccine (1 of 1 - PCV) Never done    COVID-19 Vaccine (2 - 2024-25 season) 09/01/2024      Recommendations for Preventive Services Due: see orders and patient instructions/AVS.

## 2025-06-02 NOTE — PATIENT INSTRUCTIONS
Consider 2 weeks of nsaid therapy for decreasing inflammation in shoulder and rib cage.      AM Muscle Activation Workout  Awake & Unwind  [employer]    [email or web address]      [employer] Workout - Shoulder, Upper Back & Pectoral Stretch (under 5 min.)  [email or web address]    [employer] Workout - Upper Back and Shoulder Pain-Relief Exercises (10 min.)  [email or web address]    For hip pain,   [employer] mini workout for hip pain relief (under 5 min.)  [email or web address]    For back pain and tight body joints,    [employer] Aging Backwards #5 - Relieve Your Pain (under 10 min.)  [email or web address]    [employer] Aging Back[facility] #3 - Sooth Your Joints  [email or web address]        Well Visit, Ages 18 to 65: Care Instructions  Well visits can help you stay healthy. Your doctor has checked your overall health and may have suggested ways to take good care of yourself. Your doctor also may have recommended tests. You can help prevent illness with healthy eating, good sleep, vaccinations, regular exercise, and other steps.    Get the tests that you and your doctor decide on. Depending on your age and risks, examples might include screening for diabetes; hepatitis C; HIV; and cervical, breast, lung, and colon cancer. Screening helps find diseases before any symptoms appear.   Eat healthy foods. Choose fruits, vegetables, whole grains, lean protein, and low-fat dairy foods. Limit saturated fat and reduce salt.     Limit alcohol. Men should have no more than 2 drinks a day. Women should have no more than 1. For some people, no alcohol is the best choice.   Exercise. Get at least 30 minutes of exercise on most days of the week. Walking can be a good choice.     Reach and stay at your healthy weight. This will lower your risk for many health problems.   Take care of your mental

## 2025-06-06 ENCOUNTER — HOSPITAL ENCOUNTER (OUTPATIENT)
Age: 55
Discharge: HOME | End: 2025-06-06
Payer: COMMERCIAL

## 2025-06-06 ENCOUNTER — RESULTS FOLLOW-UP (OUTPATIENT)
Dept: FAMILY MEDICINE CLINIC | Age: 55
End: 2025-06-06

## 2025-06-06 DIAGNOSIS — Z00.00 ENCOUNTER FOR WELL ADULT EXAM WITHOUT ABNORMAL FINDINGS: ICD-10-CM

## 2025-06-06 LAB
CHOLEST SERPL-MCNC: 193 MG/DL (ref 100–199)
GLUCOSE FASTING: 96 MG/DL (ref 74–109)
HDLC SERPL-MCNC: 48 MG/DL
LDLC SERPL CALC-MCNC: 130 MG/DL
TRIGL SERPL-MCNC: 76 MG/DL (ref 0–199)

## 2025-06-06 PROCEDURE — 80061 LIPID PANEL: CPT

## 2025-06-06 PROCEDURE — 82947 ASSAY GLUCOSE BLOOD QUANT: CPT

## 2025-06-06 PROCEDURE — 36415 COLL VENOUS BLD VENIPUNCTURE: CPT
